# Patient Record
Sex: MALE | Race: BLACK OR AFRICAN AMERICAN | NOT HISPANIC OR LATINO | URBAN - NONMETROPOLITAN AREA
[De-identification: names, ages, dates, MRNs, and addresses within clinical notes are randomized per-mention and may not be internally consistent; named-entity substitution may affect disease eponyms.]

---

## 2019-11-19 ENCOUNTER — IMPORTED ENCOUNTER (OUTPATIENT)
Dept: URBAN - NONMETROPOLITAN AREA CLINIC 1 | Facility: CLINIC | Age: 69
End: 2019-11-19

## 2019-11-19 PROBLEM — H26.493: Noted: 2019-11-19

## 2019-11-19 PROBLEM — H40.013: Noted: 2019-11-19

## 2019-11-19 PROBLEM — Z96.1: Noted: 2019-11-19

## 2019-11-19 PROCEDURE — 99213 OFFICE O/P EST LOW 20 MIN: CPT

## 2019-11-19 NOTE — PATIENT DISCUSSION
PC IOL OU w/PCO OU -doing wellGlaucoma Suspect-Based on disc asym. - IOP 12:14 11/19/19-Appears stable at this time.-Continue to monitor with exams and testing. pt will use OTC readers for now recommend +2.50  can come back for; 's Notes: lisbeth 8/3/17OCT Rommel Mckeon 75 11/14/18

## 2020-08-12 NOTE — PROCEDURE NOTE: SURGICAL
<p>Prior to commencing surgery patient identification, surgical procedure, site, and side were confirmed by Dr. Jeanette Samuel. Following topical proparacaine anesthesia, the patient was positioned at the YAG laser, a contact lens coupled to the cornea with methylcellulose and an axial posterior capsulotomy performed without complication using 3.2 Mj x 71. Excess methylcellulose was washed from the eye, one drop of Alphagan was instilled and the patient returned to the holding area having tolerated the procedure well and without complication. </p><p>MRN:[id number]</p>

## 2022-04-09 ASSESSMENT — TONOMETRY
OS_IOP_MMHG: 14
OD_IOP_MMHG: 12

## 2022-04-09 ASSESSMENT — VISUAL ACUITY
OD_CC: 20/30-1
OS_CC: 20/25-1

## 2024-04-04 ENCOUNTER — NEW PATIENT (OUTPATIENT)
Dept: RURAL CLINIC 2 | Facility: CLINIC | Age: 74
End: 2024-04-04

## 2024-04-04 DIAGNOSIS — Z96.1: ICD-10-CM

## 2024-04-04 DIAGNOSIS — H26.493: ICD-10-CM

## 2024-04-04 DIAGNOSIS — H04.223: ICD-10-CM

## 2024-04-04 DIAGNOSIS — H52.4: ICD-10-CM

## 2024-04-04 DIAGNOSIS — H40.013: ICD-10-CM

## 2024-04-04 PROCEDURE — 92133 CPTRZD OPH DX IMG PST SGM ON: CPT

## 2024-04-04 PROCEDURE — 76514 ECHO EXAM OF EYE THICKNESS: CPT

## 2024-04-04 PROCEDURE — 92015 DETERMINE REFRACTIVE STATE: CPT

## 2024-04-04 PROCEDURE — 92004 COMPRE OPH EXAM NEW PT 1/>: CPT

## 2024-04-04 RX ORDER — CYCLOSPORINE 0.5 MG/ML: 1 EMULSION OPHTHALMIC TWICE A DAY

## 2024-04-04 ASSESSMENT — VISUAL ACUITY
OS_SC: 20/20
OD_SC: 20/30

## 2024-04-04 ASSESSMENT — PACHYMETRY
OD_CT_UM: 548
OS_CT_UM: 544

## 2024-04-04 ASSESSMENT — TONOMETRY
OD_IOP_MMHG: 17
OS_IOP_MMHG: 19